# Patient Record
Sex: MALE | Race: WHITE | NOT HISPANIC OR LATINO | Employment: OTHER | URBAN - METROPOLITAN AREA
[De-identification: names, ages, dates, MRNs, and addresses within clinical notes are randomized per-mention and may not be internally consistent; named-entity substitution may affect disease eponyms.]

---

## 2017-08-14 ENCOUNTER — COMMUNICATION - HEALTHEAST (OUTPATIENT)
Dept: UROLOGY | Facility: CLINIC | Age: 72
End: 2017-08-14

## 2017-08-24 ENCOUNTER — OFFICE VISIT - HEALTHEAST (OUTPATIENT)
Dept: UROLOGY | Facility: CLINIC | Age: 72
End: 2017-08-24

## 2017-08-24 DIAGNOSIS — N20.1 CALCULUS OF URETER: ICD-10-CM

## 2017-08-24 DIAGNOSIS — N13.2 HYDRONEPHROSIS WITH URINARY OBSTRUCTION DUE TO URETERAL CALCULUS: ICD-10-CM

## 2017-08-24 DIAGNOSIS — N20.9 URINARY CALCULUS, UNSPECIFIED: ICD-10-CM

## 2017-08-24 DIAGNOSIS — N20.0 CALCULUS OF KIDNEY: ICD-10-CM

## 2017-09-05 ENCOUNTER — HOSPITAL ENCOUNTER (OUTPATIENT)
Dept: CT IMAGING | Facility: CLINIC | Age: 72
Discharge: HOME | End: 2017-09-05
Attending: PHYSICIAN ASSISTANT

## 2017-09-05 ENCOUNTER — OFFICE VISIT - HEALTHEAST (OUTPATIENT)
Dept: UROLOGY | Facility: CLINIC | Age: 72
End: 2017-09-05

## 2017-09-05 DIAGNOSIS — N20.1 CALCULUS OF URETER: ICD-10-CM

## 2017-09-05 DIAGNOSIS — N20.9 URINARY CALCULUS, UNSPECIFIED: ICD-10-CM

## 2017-09-18 ENCOUNTER — HOSPITAL ENCOUNTER (OUTPATIENT)
Dept: CT IMAGING | Facility: CLINIC | Age: 72
Discharge: HOME | End: 2017-09-18
Attending: PHYSICIAN ASSISTANT

## 2017-09-18 ENCOUNTER — OFFICE VISIT - HEALTHEAST (OUTPATIENT)
Dept: UROLOGY | Facility: CLINIC | Age: 72
End: 2017-09-18

## 2017-09-18 DIAGNOSIS — N20.9 URINARY CALCULUS, UNSPECIFIED: ICD-10-CM

## 2017-09-18 DIAGNOSIS — N20.1 CALCULUS OF URETER: ICD-10-CM

## 2017-10-17 ENCOUNTER — HOSPITAL ENCOUNTER (OUTPATIENT)
Dept: CT IMAGING | Facility: CLINIC | Age: 72
Discharge: HOME | End: 2017-10-17
Attending: PHYSICIAN ASSISTANT

## 2017-10-17 ENCOUNTER — OFFICE VISIT - HEALTHEAST (OUTPATIENT)
Dept: UROLOGY | Facility: CLINIC | Age: 72
End: 2017-10-17

## 2017-10-17 DIAGNOSIS — N20.1 CALCULUS OF URETER: ICD-10-CM

## 2017-10-17 DIAGNOSIS — N20.0 CALCULUS OF KIDNEY: ICD-10-CM

## 2017-10-17 DIAGNOSIS — N20.9 URINARY TRACT STONES: ICD-10-CM

## 2017-10-24 ENCOUNTER — SURGERY - HEALTHEAST (OUTPATIENT)
Dept: SURGERY | Facility: CLINIC | Age: 72
End: 2017-10-24

## 2017-10-24 ENCOUNTER — ANESTHESIA - HEALTHEAST (OUTPATIENT)
Dept: SURGERY | Facility: CLINIC | Age: 72
End: 2017-10-24

## 2017-10-24 ASSESSMENT — MIFFLIN-ST. JEOR: SCORE: 1579.49

## 2017-10-28 ENCOUNTER — COMMUNICATION - HEALTHEAST (OUTPATIENT)
Dept: SCHEDULING | Facility: CLINIC | Age: 72
End: 2017-10-28

## 2017-10-28 DIAGNOSIS — Z98.890 S/P CYSTOSCOPY: ICD-10-CM

## 2017-10-30 ENCOUNTER — COMMUNICATION - HEALTHEAST (OUTPATIENT)
Dept: UROLOGY | Facility: CLINIC | Age: 72
End: 2017-10-30

## 2017-10-31 ENCOUNTER — AMBULATORY - HEALTHEAST (OUTPATIENT)
Dept: UROLOGY | Facility: CLINIC | Age: 72
End: 2017-10-31

## 2017-10-31 DIAGNOSIS — N20.1 CALCULUS OF URETER: ICD-10-CM

## 2017-10-31 DIAGNOSIS — N20.9 CALCULUS OF URINARY TRACT: ICD-10-CM

## 2017-11-30 ENCOUNTER — OFFICE VISIT - HEALTHEAST (OUTPATIENT)
Dept: UROLOGY | Facility: CLINIC | Age: 72
End: 2017-11-30

## 2017-11-30 DIAGNOSIS — N20.0 CALCULUS OF KIDNEY: ICD-10-CM

## 2017-11-30 DIAGNOSIS — R34 LOW URINE OUTPUT: ICD-10-CM

## 2017-11-30 DIAGNOSIS — N20.1 CALCULUS OF URETER: ICD-10-CM

## 2017-11-30 DIAGNOSIS — N20.9 URINARY TRACT STONES: ICD-10-CM

## 2019-02-21 ENCOUNTER — RECORDS - HEALTHEAST (OUTPATIENT)
Dept: LAB | Facility: CLINIC | Age: 74
End: 2019-02-21

## 2019-02-21 LAB
ALBUMIN SERPL-MCNC: 3.7 G/DL (ref 3.5–5)
ALP SERPL-CCNC: 88 U/L (ref 45–120)
ALT SERPL W P-5'-P-CCNC: 12 U/L (ref 0–45)
ANION GAP SERPL CALCULATED.3IONS-SCNC: 8 MMOL/L (ref 5–18)
AST SERPL W P-5'-P-CCNC: 16 U/L (ref 0–40)
BILIRUB SERPL-MCNC: 0.7 MG/DL (ref 0–1)
BUN SERPL-MCNC: 12 MG/DL (ref 8–28)
CALCIUM SERPL-MCNC: 8.9 MG/DL (ref 8.5–10.5)
CHLORIDE BLD-SCNC: 107 MMOL/L (ref 98–107)
CHOLEST SERPL-MCNC: 158 MG/DL
CO2 SERPL-SCNC: 27 MMOL/L (ref 22–31)
CREAT SERPL-MCNC: 0.82 MG/DL (ref 0.7–1.3)
FASTING STATUS PATIENT QL REPORTED: NORMAL
GFR SERPL CREATININE-BSD FRML MDRD: >60 ML/MIN/1.73M2
GLUCOSE BLD-MCNC: 88 MG/DL (ref 70–125)
HDLC SERPL-MCNC: 43 MG/DL
LDLC SERPL CALC-MCNC: 95 MG/DL
POTASSIUM BLD-SCNC: 3.9 MMOL/L (ref 3.5–5)
PROT SERPL-MCNC: 6.1 G/DL (ref 6–8)
SODIUM SERPL-SCNC: 142 MMOL/L (ref 136–145)
TRIGL SERPL-MCNC: 99 MG/DL

## 2020-11-18 ENCOUNTER — RECORDS - HEALTHEAST (OUTPATIENT)
Dept: LAB | Facility: CLINIC | Age: 75
End: 2020-11-18

## 2020-11-18 LAB
ALBUMIN SERPL-MCNC: 4 G/DL (ref 3.5–5)
ALP SERPL-CCNC: 109 U/L (ref 45–120)
ALT SERPL W P-5'-P-CCNC: 16 U/L (ref 0–45)
ANION GAP SERPL CALCULATED.3IONS-SCNC: 10 MMOL/L (ref 5–18)
AST SERPL W P-5'-P-CCNC: 16 U/L (ref 0–40)
BILIRUB SERPL-MCNC: 0.5 MG/DL (ref 0–1)
BUN SERPL-MCNC: 13 MG/DL (ref 8–28)
CALCIUM SERPL-MCNC: 9.4 MG/DL (ref 8.5–10.5)
CHLORIDE BLD-SCNC: 106 MMOL/L (ref 98–107)
CO2 SERPL-SCNC: 28 MMOL/L (ref 22–31)
CREAT SERPL-MCNC: 0.83 MG/DL (ref 0.7–1.3)
GFR SERPL CREATININE-BSD FRML MDRD: >60 ML/MIN/1.73M2
GLUCOSE BLD-MCNC: 83 MG/DL (ref 70–125)
POTASSIUM BLD-SCNC: 4 MMOL/L (ref 3.5–5)
PROT SERPL-MCNC: 6.6 G/DL (ref 6–8)
SODIUM SERPL-SCNC: 144 MMOL/L (ref 136–145)

## 2020-11-19 LAB
CHOLEST SERPL-MCNC: 161 MG/DL
FASTING STATUS PATIENT QL REPORTED: NO
HDLC SERPL-MCNC: 41 MG/DL
LDLC SERPL CALC-MCNC: 65 MG/DL
TRIGL SERPL-MCNC: 273 MG/DL

## 2021-05-24 ENCOUNTER — RECORDS - HEALTHEAST (OUTPATIENT)
Dept: ADMINISTRATIVE | Facility: CLINIC | Age: 76
End: 2021-05-24

## 2021-05-29 ENCOUNTER — RECORDS - HEALTHEAST (OUTPATIENT)
Dept: ADMINISTRATIVE | Facility: CLINIC | Age: 76
End: 2021-05-29

## 2021-05-31 ENCOUNTER — RECORDS - HEALTHEAST (OUTPATIENT)
Dept: ADMINISTRATIVE | Facility: CLINIC | Age: 76
End: 2021-05-31

## 2021-05-31 VITALS — BODY MASS INDEX: 26.61 KG/M2 | HEIGHT: 70 IN | WEIGHT: 185.9 LBS

## 2021-06-01 ENCOUNTER — RECORDS - HEALTHEAST (OUTPATIENT)
Dept: ADMINISTRATIVE | Facility: CLINIC | Age: 76
End: 2021-06-01

## 2021-06-02 ENCOUNTER — RECORDS - HEALTHEAST (OUTPATIENT)
Dept: ADMINISTRATIVE | Facility: CLINIC | Age: 76
End: 2021-06-02

## 2021-06-12 NOTE — PROGRESS NOTES
Assessment/Plan:        Diagnoses and all orders for this visit:    Calculus of ureter  -     Symptom Control While Passing a Stone Education  -     CT Abdomen Pelvis Without Oral Without IV Contrast; Future; Expected date: 8/31/17  -     Discontinue: tamsulosin (FLOMAX) 0.4 mg Cp24; Take 1 capsule (0.4 mg total) by mouth daily for 14 days.  Dispense: 14 capsule; Refill: 1  -     tamsulosin (FLOMAX) 0.4 mg Cp24; Take 1 capsule (0.4 mg total) by mouth daily for 14 days.  Dispense: 14 capsule; Refill: 1    Urinary calculus, unspecified  -     Urinalysis Macroscopic    Calculus of kidney    Hydronephrosis with urinary obstruction due to ureteral calculus      Stone Management Plan  [facility] Stone Management 6/5/2015 7/15/2015 8/24/2017   Urinary Tract Infection No suspicion of infection No suspicion of infection No suspicion of infection   Renal Colic Asymptomatic at this time Asymptomatic at this time Asymptomatic at this time   Renal Failure No suspicion of renal failure No suspicion of renal failure No suspicion of renal failure   Current CT date 6/5/2015 - 8/13/2017   Right sided stones? No No Yes   R Number of ureteral stones - - No ureteral stones   R Number of kidney stones  - - 1   R GSD of kidney stones - - < 2   R Hydronephrosis - - None   R Stone Event No current event No current event No current event   R Current Plan - - Observe   Observe rationale - - Limited stone burden with good prognosis for spontaneous passage   Left sided stones? Yes Yes Yes   L Number of ureteral stones 1 No ureteral stones 1   L GSD of ureteral stones 3 - 4   L Location of ureteral stone Distal - Proximal   L Number of kidney stones  No renal stones 1 1   L GSD of kidney stones - < 2 < 2   L Hydronephrosis Mild - Mild   L Stone Event New event Resolved event New event   Diagnosis date 6/4/2015 - 8/13/2017   Initial location of primary symptomatic stone Distal - Proximal   Initial GSD of primary symptomatic stone 3 - 4   Resolved date -  6/7/2015 -   L MET Status Initiation Passed Initiation   L Current Plan MET Observe MET   MET 2 week F/U - 1 week F/U   Observe rationale - Limited stone burden with good prognosis for spontaneous passage -         Subjective:      HPI  Mr. Raymon Delacruz is a 71 y.o.  male returning to the [facility] [place] for unanticipated visit with acute exacerbation of chronic stone disease.      He is a rapidly recurrent calcium oxalate stone former who has not required stone clearance procedures. He has participated in stone risk evaluation in the past. He has identified modifiable stone risks including:  low urine volume. He has no identified non-modifiable stone risk factors.    Braydon was last seen 2015 following spontaneous passage of 100 % calcium oxalate 3 mm left distal ureteral stone. No other stones noted on imaging. He was to return in 1 year with imaging.    Primary symptom at presentation was acute onset, sharp left flank pain x few hours. The pain was constant without radiation. He had similar pain with prior stone event. He had associated symptoms of hematuria without clots x 1, resolved. He was evaluated in the ED 8/13/17 with labs demonstrating hematuria, mild pyuria with intact renal function and normal CRP. A CT scan reported a mildly obstructing 4 mm left upper ureteral stone and bilateral renal stones. He was discharged home with advil, meclizine, and vicodin.     He is asymptomatic at present. He had an episode of 3/10 left flank pain yesterday. He has used medication on prn basis. He has not seen a stone pass in the interval. He denies symptoms of fever, chills, flank pain, nausea, vomiting, urinary frequency and dysuria.    CT scan from 8/13/2017 is personally reviewed and demonstrates a mildly obstructing 4 mm left proximal ureteral stone. Additional < 2 mm left mid pole calculus. Single, 1 mm right upper pole papillary tip calcification.    Significant labs from presentation  include severe hematuria, mild pyuria, negative nitrite, no bacteria, normal C reactive protein, normal creatinine and normal potassium.    PLAN    72 yo M with history of single prior stone event, passed spontaneously. Rapidly recurrent calcium oxalate stone disease with new, mildly obstructing left urolithiasis. Additional, renal papillary tip calcifications.    Will proceed with medical expulsive therapy. Risks and benefits were detailed of medical expulsive therapy including probability of stone passage, recurrent renal colic, and requirement of emergency medical and/or surgical care and further imaging. Patient verbalized understanding. Patient agrees with plan as discussed. He will return in ~ 1 week with low dose CT scan.    For symptom control, he was prescribed vicodin and meclizine from ED. Will initiate flomax today. Over the counter symptom control medications of ibuprofen and Dramamine were recommended.    Patient also seen and examined by Leny Doherty PA-C      ROS   Review of systems is negative except for HPI.    Past Medical History:   Diagnosis Date     High cholesterol      Kidney stone        Past Surgical History:   Procedure Laterality Date     ABDOMINAL SURGERY      exploratory surgery at age 13     APPENDECTOMY       JOINT REPLACEMENT      knee     URETEROSCOPY Right 2013       Current Outpatient Prescriptions   Medication Sig Dispense Refill     ascorbic acid (ASCORBIC ACID WITH BLAKE HIPS) 500 MG tablet Take 500 mg by mouth daily.       aspirin 81 MG EC tablet Take 81 mg by mouth daily.       cyanocobalamin, vitamin B-12, 1,000 mcg/mL Kit Inject as directed.       HYDROcodone-acetaminophen 5-325 mg per tablet Take 1 tablet by mouth every 6 (six) hours as needed. 12 tablet 0     ibuprofen (ADVIL,MOTRIN) 600 MG tablet Take 1 tablet (600 mg total) by mouth every 6 (six) hours as needed for pain. 20 tablet 0     meclizine (ANTIVERT) 25 mg tablet Take 1 tablet (25 mg total) by mouth 3 (three)  times a day as needed. 15 tablet 0     SIMVASTATIN ORAL Take by mouth.       No current facility-administered medications for this visit.        Allergies   Allergen Reactions     Iodinated Contrast- Oral And Iv Dye Rash       Social History     Social History     Marital status:      Spouse name: N/A     Number of children: N/A     Years of education: N/A     Occupational History      [employer]      Social History Main Topics     Smoking status: Current Every Day Smoker     Years: 20.00     Smokeless tobacco: Not on file      Comment: one pack per week     Alcohol use No     Drug use: No     Sexual activity: Not on file     Other Topics Concern     Not on file     Social History Narrative     No narrative on file       Family History   Problem Relation Age of Onset     Heart disease Father      Objective:      Physical Exam  There were no vitals filed for this visit.  General - well developed, well nourished, appropriate for age. Appears no distress at this time  Abdomen - slender soft, non-tender, no hepatosplenomegaly, no masses.   - no flank tenderness, no suprapubic tenderness, kidney and bladder non-palpable  MSK - normal spinal curvature. no spinal tenderness. normal gait. muscular strength intact.  Psych - oriented to time, place, and person, normal mood and affect.        Labs   Urinalysis POC (Office):  Blood UA   Date Value Ref Range Status   07/15/2015 Negative Negative Final   06/05/2015 Negative Negative Final   01/08/2015 Negative Negative Final     Nitrite, UA   Date Value Ref Range Status   08/13/2017 Negative Negative Final   07/15/2015 Negative Negative Final   06/05/2015 Negative Negative Final   01/08/2015 Negative Negative Final   08/27/2013 Negative (Negative) Final     Leukocytes, UA    Date Value Ref Range Status   07/15/2015 Negative Negative Final   06/05/2015 Negative Negative Final   01/08/2015 Negative Negative Final     pH UA   Date  Value Ref Range Status   07/15/2015 5.5 4.5 - 8.0 Final   06/05/2015 5.5 4.5 - 8.0 Final   01/08/2015 7.0 4.5 - 8.0 Final       Lab Urinalysis:  Blood, UA   Date Value Ref Range Status   08/13/2017 Large (!) Negative Final   08/27/2013 Large (!) (Negative) Final     Nitrite, UA   Date Value Ref Range Status   08/13/2017 Negative Negative Final   07/15/2015 Negative Negative Final   06/05/2015 Negative Negative Final   01/08/2015 Negative Negative Final   08/27/2013 Negative (Negative) Final     Leukocytes, UA   Date Value Ref Range Status   08/13/2017 Trace (!) Negative Final   08/27/2013 Negative (Negative) Final     pH, UA   Date Value Ref Range Status   08/13/2017 6.0 4.5 - 8.0 Final   12/04/2013 6.5 4.5 - 8.0 Final   11/26/2013 5.5 4.5 - 8.0 Final    and Acute Labs   C Reactive Protein    CRP   Date Value Ref Range Status   08/13/2017 0.6 0.0 - 0.8 mg/dL Final   08/27/2013 0.7 <0.9 mg/dL Final    and Renal Panel  [facility]  Creatinine   Date Value Ref Range Status   08/13/2017 0.78 0.70 - 1.30 mg/dL Final   01/21/2016 0.80 0.70 - 1.30 mg/dL Final   12/05/2013 0.82 0.70 - 1.30 mg/dL Final     Potassium   Date Value Ref Range Status   08/13/2017 3.9 3.5 - 5.0 mmol/L Final   01/21/2016 4.2 3.5 - 5.0 mmol/L Final   12/05/2013 4.3 3.5 - 5.0 mmol/L Final     Calcium   Date Value Ref Range Status   08/13/2017 9.4 8.5 - 10.5 mg/dL Final   01/21/2016 9.3 8.5 - 10.5 mg/dL Final   12/05/2013 9.6 8.5 - 10.5 mg/dL Final

## 2021-06-12 NOTE — PROGRESS NOTES
Assessment/Plan:        Diagnoses and all orders for this visit:    Calculus of ureter  -     tamsulosin (FLOMAX) 0.4 mg Cp24; Take 1 capsule (0.4 mg total) by mouth daily for 14 days.  Dispense: 14 capsule; Refill: 1  -     Symptom Control While Passing a Stone Education  -     CT Abdomen Pelvis Without Oral Without IV Contrast; Future; Expected date: 9/19/17    Urinary calculus, unspecified  -     Urinalysis Macroscopic      Stone Management Plan  [facility] Stone Management 7/15/2015 8/24/2017 9/5/2017   Urinary Tract Infection No suspicion of infection No suspicion of infection No suspicion of infection   Renal Colic Asymptomatic at this time Asymptomatic at this time Asymptomatic at this time   Renal Failure No suspicion of renal failure No suspicion of renal failure No suspicion of renal failure   Current CT date - 8/13/2017 9/5/2017   Right sided stones? No Yes Yes   R Number of ureteral stones - No ureteral stones No ureteral stones   R Number of kidney stones  - 1 Renal stones unchanged from last exam   R GSD of kidney stones - < 2 < 2   R Hydronephrosis - None None   R Stone Event No current event No current event No current event   R Current Plan - Observe Observe   Observe rationale - Limited stone burden with good prognosis for spontaneous passage Limited stone burden with good prognosis for spontaneous passage   Left sided stones? Yes Yes Yes   L Number of ureteral stones No ureteral stones 1 1   L GSD of ureteral stones - 4 4   L Location of ureteral stone - Proximal Distal   L Number of kidney stones  1 1 1   L GSD of kidney stones < 2 < 2 < 2   L Hydronephrosis - Mild None   L Stone Event Resolved event New event Established event   Diagnosis date - 8/13/2017 -   Initial location of primary symptomatic stone - Proximal -   Initial GSD of primary symptomatic stone - 4 -   Resolved date 6/7/2015 - -   L MET Status Passed Initiation Progression   L Current Plan Observe MET MET   MET - 1 week F/U 2 week F/U    Observe rationale Limited stone burden with good prognosis for spontaneous passage - -             Subjective:      HPI  Mr. Raymon Delacruz is a 71 y.o.  male returning to the [facility] [place] for medical expulsive therapy follow up.     On last encounter, his 4 mm stone was in left proximal ureter with mild hydronephrosis. He has had no unanticipated events.    Symptoms have been well controlled with medication and he is able to carry on normal activities. He is asymptomatic at present. He denies symptoms of fever, chills, flank pain, nausea, vomiting, urinary frequency and dysuria.     New CT scan was personally reviewed and demonstrates progression of stone to left distal ureter with resolution of previous hydronephrosis.     PLAN    72 yo M with history of single prior stone event, passed spontaneously. Rapidly recurrent calcium oxalate stone disease with distal progression of left urolithiasis, now in distal ureter. Asymptomatic.    He will continue to attempt to pass stone and will return in 2 weeks with further imaging.    Patient also seen and examined by Leny Doherty PA-C      ROS   Review of systems is negative except for HPI.    Past Medical History:   Diagnosis Date     High cholesterol      Kidney stone        Past Surgical History:   Procedure Laterality Date     ABDOMINAL SURGERY      exploratory surgery at age 13     APPENDECTOMY       JOINT REPLACEMENT      knee     URETEROSCOPY Right 2013       Current Outpatient Prescriptions   Medication Sig Dispense Refill     ascorbic acid (ASCORBIC ACID WITH BLAKE HIPS) 500 MG tablet Take 500 mg by mouth daily.       aspirin 81 MG EC tablet Take 81 mg by mouth daily.       cyanocobalamin, vitamin B-12, 1,000 mcg/mL Kit Inject as directed.       ibuprofen (ADVIL,MOTRIN) 600 MG tablet Take 1 tablet (600 mg total) by mouth every 6 (six) hours as needed for pain. 20 tablet 0     meclizine (ANTIVERT) 25 mg tablet Take 1 tablet (25 mg  total) by mouth 3 (three) times a day as needed. 15 tablet 0     SIMVASTATIN ORAL Take by mouth.       tamsulosin (FLOMAX) 0.4 mg Cp24 Take 1 capsule (0.4 mg total) by mouth daily for 14 days. 14 capsule 1     HYDROcodone-acetaminophen 5-325 mg per tablet Take 1 tablet by mouth every 6 (six) hours as needed. 12 tablet 0     No current facility-administered medications for this visit.        Allergies   Allergen Reactions     Iodinated Contrast- Oral And Iv Dye Rash       Social History     Social History     Marital status:      Spouse name: N/A     Number of children: N/A     Years of education: N/A     Occupational History      [employer]      Social History Main Topics     Smoking status: Current Every Day Smoker     Years: 20.00     Smokeless tobacco: Never Used      Comment: one pack per week     Alcohol use No     Drug use: No     Sexual activity: Not on file     Other Topics Concern     Not on file     Social History Narrative       Family History   Problem Relation Age of Onset     Heart disease Father      Objective:      Physical Exam  Vitals:    09/05/17 1104   BP: (!) 172/93   Pulse: 65   Temp: 98.2  F (36.8  C)     General - well developed, well nourished, appropriate for age. Appears no distress at this time  Abdomen - slender soft, non-tender, no hepatosplenomegaly, no masses.   - no flank tenderness, no suprapubic tenderness, kidney and bladder non-palpable  MSK - normal spinal curvature. no spinal tenderness. normal gait. muscular strength intact.  Psych - oriented to time, place, and person, normal mood and affect.        Labs   Urinalysis POC (Office):  Blood UA   Date Value Ref Range Status   07/15/2015 Negative Negative Final   06/05/2015 Negative Negative Final   01/08/2015 Negative Negative Final     Nitrite, UA   Date Value Ref Range Status   09/05/2017 Negative Negative Final   08/24/2017 Negative Negative Final   08/13/2017 Negative  Negative Final     Leukocytes, UA    Date Value Ref Range Status   07/15/2015 Negative Negative Final   06/05/2015 Negative Negative Final   01/08/2015 Negative Negative Final     pH UA   Date Value Ref Range Status   07/15/2015 5.5 4.5 - 8.0 Final   06/05/2015 5.5 4.5 - 8.0 Final   01/08/2015 7.0 4.5 - 8.0 Final       Lab Urinalysis:  Blood, UA   Date Value Ref Range Status   09/05/2017 Negative Negative Final   08/24/2017 Moderate (!) Negative Final   08/13/2017 Large (!) Negative Final     Nitrite, UA   Date Value Ref Range Status   09/05/2017 Negative Negative Final   08/24/2017 Negative Negative Final   08/13/2017 Negative Negative Final     Leukocytes, UA   Date Value Ref Range Status   09/05/2017 Negative Negative Final   08/24/2017 Small (!) Negative Final   08/13/2017 Trace (!) Negative Final     pH, UA   Date Value Ref Range Status   09/05/2017 7.0 5.0 - 8.0 Final   08/24/2017 5.5 4.5 - 8.0 Final   08/13/2017 6.0 4.5 - 8.0 Final

## 2021-06-13 NOTE — PROGRESS NOTES
Assessment/Plan:        Diagnoses and all orders for this visit:    Calculus of ureter  -     Symptom Control While Passing a Stone Education  -     CT Abdomen Pelvis Without Oral Without IV Contrast; Future; Expected date: 10/16/17  -     tamsulosin (FLOMAX) 0.4 mg Cp24; Take 1 capsule (0.4 mg total) by mouth daily.  Dispense: 30 capsule; Refill: 0    Urinary calculus, unspecified  -     Urinalysis Macroscopic      Stone Management Plan  [facility] Stone Management 8/24/2017 9/5/2017 9/18/2017   Urinary Tract Infection No suspicion of infection No suspicion of infection No suspicion of infection   Renal Colic Asymptomatic at this time Asymptomatic at this time Asymptomatic at this time   Renal Failure No suspicion of renal failure No suspicion of renal failure No suspicion of renal failure   Current CT date 8/13/2017 9/5/2017 9/18/2017   Right sided stones? Yes Yes Yes   R Number of ureteral stones No ureteral stones No ureteral stones No ureteral stones   R Number of kidney stones  1 Renal stones unchanged from last exam Renal stones unchanged from last exam   R GSD of kidney stones < 2 < 2 < 2   R Hydronephrosis None None None   R Stone Event No current event No current event No current event   R Current Plan Observe Observe Observe   Observe rationale Limited stone burden with good prognosis for spontaneous passage Limited stone burden with good prognosis for spontaneous passage Limited stone burden with good prognosis for spontaneous passage   Left sided stones? Yes Yes Yes   L Number of ureteral stones 1 1 1   L GSD of ureteral stones 4 4 4   L Location of ureteral stone Proximal Distal Distal   L Number of kidney stones  1 1 1   L GSD of kidney stones < 2 < 2 < 2   L Hydronephrosis Mild None None   L Stone Event New event Established event Established event   Diagnosis date 8/13/2017 - -   Initial location of primary symptomatic stone Proximal - -   Initial GSD of primary symptomatic stone 4 - -   Resolved date - -  -   L MET Status Initiation Progression No progression   L Current Plan MET MET MET   MET 1 week F/U 2 week F/U (No Data)   Observe rationale - - -             Subjective:      HPIMrMaggie Delacruz is a 71 y.o.  male returning to the [facility] [place] for medical expulsive therapy follow up.     On last encounter, his 4 mm stone was in left distal ureter with no hydronephrosis. He has had no unanticipated events.    Symptoms have been minimal . He has been asymptomatic with stone following initial diagnosis on 8/13/17. He is asymptomatic at present. He denies symptoms of fever, chills, flank pain, nausea, vomiting, urinary frequency and dysuria.     New CT scan was personally reviewed and demonstrates no progression of stone with no hydronephrosis.     PLAN    70 yo M with hx of single prior stone event. No progression of non-obstructing left distal ureteral calculus, asymptomatic.     He will continue to attempt to pass stone and will return in 4 weeks with further imaging.     Patient also seen and examined by Leny Doherty PA-C       ROS   Review of systems is negative except for HPI.    Past Medical History:   Diagnosis Date     High cholesterol      Kidney stone      Past Surgical History:   Procedure Laterality Date     ABDOMINAL SURGERY      exploratory surgery at age 13     APPENDECTOMY       JOINT REPLACEMENT      knee     URETEROSCOPY Right 2013       Current Outpatient Prescriptions   Medication Sig Dispense Refill     ascorbic acid (ASCORBIC ACID WITH BLAKE HIPS) 500 MG tablet Take 500 mg by mouth daily.       aspirin 81 MG EC tablet Take 81 mg by mouth daily.       cyanocobalamin, vitamin B-12, 1,000 mcg/mL Kit Inject as directed.       ibuprofen (ADVIL,MOTRIN) 600 MG tablet Take 1 tablet (600 mg total) by mouth every 6 (six) hours as needed for pain. 20 tablet 0     meclizine (ANTIVERT) 25 mg tablet Take 1 tablet (25 mg total) by mouth 3 (three) times a day as needed. 15 tablet  0     SIMVASTATIN ORAL Take by mouth.       tamsulosin (FLOMAX) 0.4 mg Cp24 Take 1 capsule (0.4 mg total) by mouth daily. 30 capsule 0     HYDROcodone-acetaminophen 5-325 mg per tablet Take 1 tablet by mouth every 6 (six) hours as needed. 12 tablet 0     No current facility-administered medications for this visit.        Allergies   Allergen Reactions     Iodinated Contrast- Oral And Iv Dye Rash       Social History     Social History     Marital status:      Spouse name: N/A     Number of children: N/A     Years of education: N/A     Occupational History      [employer]      Social History Main Topics     Smoking status: Current Every Day Smoker     Years: 20.00     Smokeless tobacco: Never Used      Comment: one pack per week     Alcohol use No     Drug use: No     Sexual activity: Not on file     Other Topics Concern     Not on file     Social History Narrative       Family History   Problem Relation Age of Onset     Heart disease Father      Objective:      Physical Exam  Vitals:    09/18/17 1403   BP: 160/85   Pulse: 71   Temp: 97.9  F (36.6  C)     General - well developed, well nourished, appropriate for age. Appears no distress at this time  Abdomen - soft, non-tender, no hepatosplenomegaly, no masses.   - no flank tenderness, no suprapubic tenderness, kidney and bladder non-palpable  MSK - normal spinal curvature. no spinal tenderness. normal gait. muscular strength intact.  Psych - oriented to time, place, and person, normal mood and affect.        Labs   Urinalysis POC (Office):  Blood UA   Date Value Ref Range Status   07/15/2015 Negative Negative Final   06/05/2015 Negative Negative Final   01/08/2015 Negative Negative Final     Nitrite, UA   Date Value Ref Range Status   09/18/2017 Negative Negative Final   09/05/2017 Negative Negative Final   08/24/2017 Negative Negative Final     Leukocytes, UA    Date Value Ref Range Status   07/15/2015 Negative  Negative Final   06/05/2015 Negative Negative Final   01/08/2015 Negative Negative Final     pH UA   Date Value Ref Range Status   07/15/2015 5.5 4.5 - 8.0 Final   06/05/2015 5.5 4.5 - 8.0 Final   01/08/2015 7.0 4.5 - 8.0 Final       Lab Urinalysis:  Blood, UA   Date Value Ref Range Status   09/18/2017 Negative Negative Final   09/05/2017 Negative Negative Final   08/24/2017 Moderate (!) Negative Final     Nitrite, UA   Date Value Ref Range Status   09/18/2017 Negative Negative Final   09/05/2017 Negative Negative Final   08/24/2017 Negative Negative Final     Leukocytes, UA   Date Value Ref Range Status   09/18/2017 Negative Negative Final   09/05/2017 Negative Negative Final   08/24/2017 Small (!) Negative Final     pH, UA   Date Value Ref Range Status   09/18/2017 6.0 5.0 - 8.0 Final   09/05/2017 7.0 5.0 - 8.0 Final   08/24/2017 5.5 4.5 - 8.0 Final

## 2021-06-13 NOTE — PROGRESS NOTES
Patient educated regarding stent removal procedure and possible symptoms after removal.  Patient voiced understanding of information.  Handout given to patient.  Consent form signed.     [facility] Timeout    Correct patient?: Yes  Correct site?:  Yes  Correct procedure?:  Yes  Correct laterality?:  Left  Consents verified?:  Yes  Relevant lab results available?:  Yes

## 2021-06-13 NOTE — PROGRESS NOTES
Assessment/Plan:        Diagnoses and all orders for this visit:    Calculus of ureter  -     Place sequential compression device; Standing  -     Patient Stated Goal: Know what to expect after surgery  -     Ureteroscopy Education  -     Verify informed consent; Standing  -     Diet NPO; Standing  -     XR Abdomen AP; Standing  -     levoFLOXacin 500 mg/100 mL IVPB 500 mg (LEVAQUIN); Infuse 100 mL (500 mg total) into a venous catheter 60 minutes before surgery or procedure (On Call to OR).    Urinary tract stones  -     Urinalysis Macroscopic    Calculus of kidney      Stone Management Plan  [facility] Stone [facility] 9/5/2017 9/18/2017 10/17/2017   Urinary Tract Infection No suspicion of infection No suspicion of infection No suspicion of infection   Renal Colic Asymptomatic at this time Asymptomatic at this time Asymptomatic at this time   Renal Failure No suspicion of renal failure No suspicion of renal failure No suspicion of renal failure   Current CT date 9/5/2017 9/18/2017 10/17/2017   Right sided stones? Yes Yes Yes   R Number of ureteral stones No ureteral stones No ureteral stones No ureteral stones   R Number of kidney stones  Renal stones unchanged from last exam Renal stones unchanged from last exam Renal stones unchanged from last exam   R GSD of kidney stones < 2 < 2 < 2   R Hydronephrosis None None None   R Stone Event No current event No current event No current event   R Current Plan Observe Observe Observe   Observe rationale Limited stone burden with good prognosis for spontaneous passage Limited stone burden with good prognosis for spontaneous passage Limited stone burden with good prognosis for spontaneous passage   Left sided stones? Yes Yes Yes   L Number of ureteral stones 1 1 1   L GSD of ureteral stones 4 4 4   L Location of ureteral stone Distal Distal Distal   L Number of kidney stones  1 1 1   L GSD of kidney stones < 2 < 2 < 2   L Hydronephrosis None None None   L Stone Event Established  event Established event Established event   Diagnosis date - - -   Initial location of primary symptomatic stone - - -   Initial GSD of primary symptomatic stone - - -   Resolved date - - -   L MET Status Progression No progression No progression   L Current Plan MET MET Clear   MET 2 week F/U (No Data) -   Clear rationale - - MET failure   Observe rationale - - -         Subjective:      HPI  Mr. Raymon Delacruz is a 71 y.o.  male returning to the [facility] [place] for medical expulsive therapy follow up.     On last encounter, his 4 mm stone was in left distal ureter with without hydronephrosis. He has had no unanticipated events.    Symptoms have been minimal. He had an episode of mild left flank pain 2 weeks ago, but otherwise has been asymptomatic through most of this prolonged trial of passage period. He is asymptomatic at present. He denies symptoms of fever, chills, flank pain, nausea, vomiting, urinary frequency and dysuria.     New CT scan was personally reviewed and demonstrates no progression of stone with no hydronephrosis. Previous bilateral tiny renal papillary tip calcifications not visualized.     PLAN    72 yo M with hx of single prior stone event. No progression of non-obstructing left distal ureteral calculus, asymptomatic.     He has failed adequate duration of medical expulsive therapy and will proceed to the operating room for ureteroscopic stone clearance. Risks and benefits were detailed of ureteroscopic stone clearance including potential issues of urinary or systemic infection, ureteral injury, inaccessible stone, incomplete stone clearance, multiple surgeries, and stent related symptoms of urgency, frequency and hematuria. Current documentation is adequate for preoperative history and physical.    Patient also seen and examined by KRISTA Park   A 12 point comprehensive review of systems is negative except for HPI.    Past Medical History:   Diagnosis Date      High cholesterol      Kidney stone        Past Surgical History:   Procedure Laterality Date     ABDOMINAL SURGERY      exploratory surgery at age 13     APPENDECTOMY       JOINT REPLACEMENT      knee     URETEROSCOPY Right 2013       Current Outpatient Prescriptions   Medication Sig Dispense Refill     ascorbic acid (ASCORBIC ACID WITH BLAKE HIPS) 500 MG tablet Take 500 mg by mouth daily.       aspirin 81 MG EC tablet Take 81 mg by mouth daily.       cyanocobalamin, vitamin B-12, 1,000 mcg/mL Kit Inject as directed.       meclizine (ANTIVERT) 25 mg tablet Take 1 tablet (25 mg total) by mouth 3 (three) times a day as needed. 15 tablet 0     SIMVASTATIN ORAL Take by mouth.       tamsulosin (FLOMAX) 0.4 mg Cp24 Take 1 capsule (0.4 mg total) by mouth daily. 30 capsule 0     HYDROcodone-acetaminophen 5-325 mg per tablet Take 1 tablet by mouth every 6 (six) hours as needed. 12 tablet 0     ibuprofen (ADVIL,MOTRIN) 600 MG tablet Take 1 tablet (600 mg total) by mouth every 6 (six) hours as needed for pain. 20 tablet 0     No current facility-administered medications for this visit.        Allergies   Allergen Reactions     Iodinated Contrast- Oral And Iv Dye Rash       Social History     Social History     Marital status:      Spouse name: N/A     Number of children: N/A     Years of education: N/A     Occupational History      [employer]      Social History Main Topics     Smoking status: Current Every Day Smoker     Years: 20.00     Smokeless tobacco: Never Used      Comment: one pack per week     Alcohol use No     Drug use: No     Sexual activity: Not on file     Other Topics Concern     Not on file     Social History Narrative       Family History   Problem Relation Age of Onset     Heart disease Father      Objective:      Physical Exam  Vitals:    10/17/17 1306   BP: 156/89   Pulse: 93   Temp: 98.1  F (36.7  C)     General - well developed, well nourished,  appropriate for age. Appears no distress at this time   Heart - regular rate and rhythm, no murmur  Respiratory - normal effort, clear to auscultation, good air entry without adventitious noises  Abdomen - slender soft, non-tender, no hepatosplenomegaly, no masses.   - no flank tenderness, no suprapubic tenderness, kidney and bladder non-palpable  MSK - normal spinal curvature. no spinal tenderness. normal gait. muscular strength intact.  Neurology - cranial nerves II-XII grossly intact, normal sensation, no unsteadiness  Skin - intact, no bruising, no gouty tophi  Psych - oriented to time, place, and person, normal mood and affect.        Labs   Urinalysis POC (Office):  Blood UA   Date Value Ref Range Status   07/15/2015 Negative Negative Final   06/05/2015 Negative Negative Final   01/08/2015 Negative Negative Final     Nitrite, UA   Date Value Ref Range Status   10/17/2017 Negative Negative Final   09/18/2017 Negative Negative Final   09/05/2017 Negative Negative Final     Leukocytes, UA    Date Value Ref Range Status   07/15/2015 Negative Negative Final   06/05/2015 Negative Negative Final   01/08/2015 Negative Negative Final     pH UA   Date Value Ref Range Status   07/15/2015 5.5 4.5 - 8.0 Final   06/05/2015 5.5 4.5 - 8.0 Final   01/08/2015 7.0 4.5 - 8.0 Final       Lab Urinalysis:  Blood, UA   Date Value Ref Range Status   10/17/2017 Negative Negative Final   09/18/2017 Negative Negative Final   09/05/2017 Negative Negative Final     Nitrite, UA   Date Value Ref Range Status   10/17/2017 Negative Negative Final   09/18/2017 Negative Negative Final   09/05/2017 Negative Negative Final     Leukocytes, UA   Date Value Ref Range Status   10/17/2017 Negative Negative Final   09/18/2017 Negative Negative Final   09/05/2017 Negative Negative Final     pH, UA   Date Value Ref Range Status   10/17/2017 5.5 5.0 - 8.0 Final   09/18/2017 6.0 5.0 - 8.0 Final   09/05/2017 7.0 5.0 - 8.0 Final

## 2021-06-13 NOTE — PROGRESS NOTES
Assessment/Plan:        Diagnoses and all orders for this visit:    Calculus of ureter  -     Cystoscopy with Stent Removal Education  -     Patient Stated Goal: Prevent further stones    Calculus of urinary tract  -     Urinalysis Macroscopic  -     Culture, Urine  -     ciprofloxacin HCl tablet 250 mg (CIPRO); Take 1 tablet (250 mg total) by mouth once.  -     lidocaine HCl 2 % topical jelly 10 mL (UROJET); Insert 10 mL into the urethra once.    Other orders  -     ciprofloxacin HCl (CIPRO) 250 MG tablet;   -     lidocaine HCl (UROJET) 2 % topical jelly;       Stone Management Plan  [facility] Stone Management 9/18/2017 10/17/2017 10/31/2017   Urinary Tract Infection No suspicion of infection No suspicion of infection No suspicion of infection   Renal Colic Asymptomatic at this time Asymptomatic at this time Well controlled symptoms   Renal Failure No suspicion of renal failure No suspicion of renal failure No suspicion of renal failure   Current CT date 9/18/2017 10/17/2017 -   Right sided stones? Yes Yes -   R Number of ureteral stones No ureteral stones No ureteral stones -   R Number of kidney stones  Renal stones unchanged from last exam Renal stones unchanged from last exam -   R GSD of kidney stones < 2 < 2 -   R Hydronephrosis None None -   R Stone Event No current event No current event No current event   R Current Plan Observe Observe -   Observe rationale Limited stone burden with good prognosis for spontaneous passage Limited stone burden with good prognosis for spontaneous passage -   Left sided stones? Yes Yes -   L Number of ureteral stones 1 1 -   L GSD of ureteral stones 4 4 -   L Location of ureteral stone Distal Distal -   L Number of kidney stones  1 1 -   L GSD of kidney stones < 2 < 2 -   L Hydronephrosis None None -   L Stone Event Established event Established event Established event   Diagnosis date - - -   Initial location of primary symptomatic stone - - -   Initial GSD of primary symptomatic  stone - - -   Resolved date - - -   Post-op status - - Stent Removal   L MET Status No progression No progression -   L Current Plan MET Clear -   MET (No Data) - -   Clear rationale - MET failure -   Observe rationale - - -             Subjective:      HPI  Mr. Raymon Delacruz is a 71 y.o.  male returning to the [facility] [place] for early postoperative follow up for anticipated stent removal.     He returns status post left ureteroscopic extraction for distal ureteral stone. He has had no unanticipated post-operative events.    He has had no symptoms suspicious for infection and stent was mildly symptomatic with typical issues of flank discomfort and lower urinary tract irritation.     Flexible cystoscopy is performed and indwelling stent is removed without incident.    He will follow up in the office in one month without imaging.       ROS   Review of systems is negative except for HPI.    Past Medical History:   Diagnosis Date     High cholesterol      Kidney stone     recurrent       Past Surgical History:   Procedure Laterality Date     ABDOMINAL SURGERY      exploratory surgery at age 13, Appendectomy     CYSTOSCOPY W/ URETERAL STENT PLACEMENT Left 10/24/2017    Procedure: CYSTOSCOPY, WITH URETEROSCOPY, CALCULUS REMOVAL, AND URETERAL STENT INSERTION;  Surgeon: Gasper Conteh MD;  Location: [facility];  Service:      JOINT REPLACEMENT Left 2010    knee     URETEROSCOPY Right 2013       Current Outpatient Prescriptions   Medication Sig Dispense Refill     ascorbic acid (ASCORBIC ACID WITH BLAKE HIPS) 500 MG tablet Take 500 mg by mouth daily.       aspirin 81 MG EC tablet Take 81 mg by mouth daily.       cyanocobalamin, vitamin B-12, 1,000 mcg/mL Kit Inject as directed every 30 (thirty) days.        HYDROcodone-acetaminophen 5-325 mg per tablet Take 1 tablet by mouth every 6 (six) hours as needed. 12 tablet 0     ibuprofen (ADVIL,MOTRIN) 600 MG tablet Take 1 tablet (600 mg  total) by mouth every 6 (six) hours as needed for pain. 20 tablet 0     meclizine (ANTIVERT) 25 mg tablet Take 1 tablet (25 mg total) by mouth 3 (three) times a day as needed. 15 tablet 0     simvastatin (ZOCOR) 20 MG tablet Take 20 mg by mouth at bedtime.       tamsulosin (FLOMAX) 0.4 mg Cp24 Take 1 capsule (0.4 mg total) by mouth Daily after breakfast. Take with food 14 capsule 0     Current Facility-Administered Medications   Medication Dose Route Frequency Provider Last Rate Last Dose     ciprofloxacin HCl (CIPRO) 250 MG tablet              lidocaine HCl (UROJET) 2 % topical jelly                Allergies   Allergen Reactions     Iodinated Contrast- Oral And Iv Dye Rash       Social History     Social History     Marital status:      Spouse name: N/A     Number of children: N/A     Years of education: N/A     Occupational History      [employer]      Social History Main Topics     Smoking status: Current Every Day Smoker     Years: 20.00     Types: Cigarettes     Smokeless tobacco: Never Used      Comment: 3 packs per week     Alcohol use Yes      Comment: rarely     Drug use: No     Sexual activity: Not on file     Other Topics Concern     Not on file     Social History Narrative       Family History   Problem Relation Age of Onset     Heart disease Father      Objective:      Physical Exam  Vitals:    10/31/17 1001   BP: (!) 135/104   Pulse: 74   Temp: 98  F (36.7  C)     General - well developed, well nourished, appropriate for age. Appears no distress at this time  Abdomen - mildly obese soft, non-tender, no hepatosplenomegaly, no masses.   - no flank tenderness, no suprapubic tenderness, kidney and bladder non-palpable  MSK - normal spinal curvature. no spinal tenderness. normal gait. muscular strength intact.  Psych - oriented to time, place, and person, normal mood and affect.        Labs   Urinalysis POC (Office):  Blood UA   Date Value Ref Range Status    07/15/2015 Negative Negative Final   06/05/2015 Negative Negative Final   01/08/2015 Negative Negative Final     Nitrite, UA   Date Value Ref Range Status   10/31/2017 Negative Negative Final   10/17/2017 Negative Negative Final   09/18/2017 Negative Negative Final     Leukocytes, UA    Date Value Ref Range Status   07/15/2015 Negative Negative Final   06/05/2015 Negative Negative Final   01/08/2015 Negative Negative Final     pH UA   Date Value Ref Range Status   07/15/2015 5.5 4.5 - 8.0 Final   06/05/2015 5.5 4.5 - 8.0 Final   01/08/2015 7.0 4.5 - 8.0 Final       Lab Urinalysis:  Blood, UA   Date Value Ref Range Status   10/31/2017 Large (!) Negative Final   10/17/2017 Negative Negative Final   09/18/2017 Negative Negative Final     Nitrite, UA   Date Value Ref Range Status   10/31/2017 Negative Negative Final   10/17/2017 Negative Negative Final   09/18/2017 Negative Negative Final     Leukocytes, UA   Date Value Ref Range Status   10/31/2017 Small (!) Negative Final   10/17/2017 Negative Negative Final   09/18/2017 Negative Negative Final     pH, UA   Date Value Ref Range Status   10/31/2017 6.5 5.0 - 8.0 Final   10/17/2017 5.5 5.0 - 8.0 Final   09/18/2017 6.0 5.0 - 8.0 Final

## 2021-06-13 NOTE — ANESTHESIA PREPROCEDURE EVALUATION
Anesthesia Evaluation      Patient summary reviewed   No history of anesthetic complications     Airway   Mallampati: II  Neck ROM: full   Pulmonary - normal exam    breath sounds clear to auscultation                         Cardiovascular - normal exam   Neuro/Psych      Endo/Other       GI/Hepatic/Renal    (+)   chronic renal disease,           Dental - normal exam                        Anesthesia Plan  Planned anesthetic: general LMA    ASA 2   Induction: intravenous   Anesthetic plan and risks discussed with: patient  Anesthesia plan special considerations: antiemetics,   Post-op plan: routine recovery

## 2021-06-13 NOTE — ANESTHESIA POSTPROCEDURE EVALUATION
Patient: Raymon Delacruz  CYSTOSCOPY LEFT URETEROSOCPY STENT INSERTION  Anesthesia type: general    Patient location: PACU  Last vitals:   Vitals:    10/24/17 1324   BP:    Pulse:    Resp: 26   Temp:    SpO2:      Post vital signs: stable  Level of consciousness: awake and responds to simple questions  Post-anesthesia pain: pain controlled  Post-anesthesia nausea and vomiting: no  Pulmonary: unassisted, return to baseline  Cardiovascular: stable and blood pressure at baseline  Hydration: adequate  Anesthetic events: no    QCDR Measures:  ASA# 11 - Lali-op Cardiac Arrest: ASA11B - Patient did NOT experience unanticipated cardiac arrest  ASA# 12 - Lali-op Mortality Rate: ASA12B - Patient did NOT die  ASA# 13 - PACU Re-Intubation Rate: ASA13B - Patient did NOT require a new airway mgmt  ASA# 10 - Composite Anes Safety: ASA10A - No serious adverse event    Additional Notes:

## 2021-06-14 NOTE — PROGRESS NOTES
Assessment/Plan:        Diagnoses and all orders for this visit:    Calculus of ureter    Low urine output    Calculus of kidney  -     CT Abdomen Pelvis Without Oral Without IV Contrast; Future; Expected date: 11/30/18  -     Patient Stated Goal: Prevent further stones  -     Patient Increasing Fluids Education    Urinary tract stones  -     Urinalysis Macroscopic      Stone Management Plan  [facility] Stone Management 10/17/2017 10/31/2017 11/30/2017   Urinary Tract Infection No suspicion of infection No suspicion of infection No suspicion of infection   Renal Colic Asymptomatic at this time Well controlled symptoms Asymptomatic at this time   Renal Failure No suspicion of renal failure No suspicion of renal failure No suspicion of renal failure   Current CT date 10/17/2017 - -   Right sided stones? Yes - -   R Number of ureteral stones No ureteral stones - -   R Number of kidney stones  Renal stones unchanged from last exam - -   R GSD of kidney stones < 2 - -   R Hydronephrosis None - -   R Stone Event No current event No current event No current event   R Current Plan Observe - Observe   Observe rationale Limited stone burden with good prognosis for spontaneous passage - Limited stone burden with good prognosis for spontaneous passage   Left sided stones? Yes - -   L Number of ureteral stones 1 - -   L GSD of ureteral stones 4 - -   L Location of ureteral stone Distal - -   L Number of kidney stones  1 - -   L GSD of kidney stones < 2 - -   L Hydronephrosis None - -   L Stone Event Established event Established event Resolved event   Diagnosis date - - -   Initial location of primary symptomatic stone - - -   Initial GSD of primary symptomatic stone - - -   Resolved date - - 11/30/2017   Post-op status - Stent Removal No imaging   L MET Status No progression - -   L Current Plan Clear - Observe   MET - - -   Clear rationale MET failure - -   Observe rationale - - Limited stone burden with good prognosis for  spontaneous passage             Subjective:      HPI  Mr. Raymon Delacruz is a 72 y.o.  male returning to the [facility] [place] for late postoperative follow-up.     He returns status post Left ureteroscopic extraction for distal ureteral stone. He has had no unanticipated events.     He is asymptomatic at present. He denies symptoms of fever, chills, flank pain, nausea, vomiting, urinary frequency and dysuria.    Imaging was not performed today because stone was extracted intact and patient is asymptomatic. Preoperative imaging was notable for a couple tiny papillary tip calcifications.    Stone composition was 100% calcium oxalate.     PLAN    72 yo M with hx of single prior stone event s/p ureteroscopic clearance of left distal ureteral calculus, asymptomatic. No imaging warranted.    He has previously undergone stone risk evaluation with identified risk of low urine volume. He will work to increase and sustain high fluid intake.    He will return in 12 months with imaging.    Patient also seen and examined by KRISTA Park   Review of systems is negative except for HPI.    Past Medical History:   Diagnosis Date     High cholesterol      Kidney stone     recurrent       Past Surgical History:   Procedure Laterality Date     ABDOMINAL SURGERY      exploratory surgery at age 13, Appendectomy     CYSTOSCOPY W/ URETERAL STENT PLACEMENT Left 10/24/2017    Procedure: CYSTOSCOPY, WITH URETEROSCOPY, CALCULUS REMOVAL, AND URETERAL STENT INSERTION;  Surgeon: Gasper Conteh MD;  Location: [facility];  Service:      JOINT REPLACEMENT Left 2010    knee     URETEROSCOPY Right 2013       Current Outpatient Prescriptions   Medication Sig Dispense Refill     ascorbic acid (ASCORBIC ACID WITH BLAKE HIPS) 500 MG tablet Take 500 mg by mouth daily.       aspirin 81 MG EC tablet Take 81 mg by mouth daily.       cyanocobalamin, vitamin B-12, 1,000 mcg/mL Kit Inject as directed every 30  (thirty) days.        ibuprofen (ADVIL,MOTRIN) 600 MG tablet Take 1 tablet (600 mg total) by mouth every 6 (six) hours as needed for pain. 20 tablet 0     meclizine (ANTIVERT) 25 mg tablet Take 1 tablet (25 mg total) by mouth 3 (three) times a day as needed. 15 tablet 0     simvastatin (ZOCOR) 20 MG tablet Take 20 mg by mouth at bedtime.       No current facility-administered medications for this visit.        Allergies   Allergen Reactions     Iodinated Contrast- Oral And Iv Dye Rash       Social History     Social History     Marital status:      Spouse name: N/A     Number of children: N/A     Years of education: N/A     Occupational History      [employer]      Social History Main Topics     Smoking status: Current Every Day Smoker     Years: 20.00     Types: Cigarettes     Smokeless tobacco: Never Used      Comment: 3 packs per week     Alcohol use Yes      Comment: rarely     Drug use: No     Sexual activity: Not on file     Other Topics Concern     Not on file     Social History Narrative       Family History   Problem Relation Age of Onset     Heart disease Father      Objective:      Physical Exam  Vitals:    11/30/17 1005   BP: 173/84   Pulse: (!) 54   Temp: 98  F (36.7  C)     General - well developed, well nourished, appropriate for age. Appears no distress at this time  Abdomen - slender soft, non-tender, no hepatosplenomegaly, no masses.   - no flank tenderness, no suprapubic tenderness, kidney and bladder non-palpable  MSK - normal spinal curvature. no spinal tenderness. normal gait. muscular strength intact.  Psych - oriented to time, place, and person, normal mood and affect.        Labs   Urinalysis POC (Office):  Blood UA   Date Value Ref Range Status   07/15/2015 Negative Negative Final   06/05/2015 Negative Negative Final   01/08/2015 Negative Negative Final     Nitrite, UA   Date Value Ref Range Status   11/30/2017 Negative Negative Final    10/31/2017 Negative Negative Final   10/17/2017 Negative Negative Final     Leukocytes, UA    Date Value Ref Range Status   07/15/2015 Negative Negative Final   06/05/2015 Negative Negative Final   01/08/2015 Negative Negative Final     pH UA   Date Value Ref Range Status   07/15/2015 5.5 4.5 - 8.0 Final   06/05/2015 5.5 4.5 - 8.0 Final   01/08/2015 7.0 4.5 - 8.0 Final       Lab Urinalysis:  Blood, UA   Date Value Ref Range Status   11/30/2017 Negative Negative Final   10/31/2017 Large (!) Negative Final   10/17/2017 Negative Negative Final     Nitrite, UA   Date Value Ref Range Status   11/30/2017 Negative Negative Final   10/31/2017 Negative Negative Final   10/17/2017 Negative Negative Final     Leukocytes, UA   Date Value Ref Range Status   11/30/2017 Negative Negative Final   10/31/2017 Small (!) Negative Final   10/17/2017 Negative Negative Final     pH, UA   Date Value Ref Range Status   11/30/2017 7.0 5.0 - 8.0 Final   10/31/2017 6.5 5.0 - 8.0 Final   10/17/2017 5.5 5.0 - 8.0 Final

## 2021-06-16 PROBLEM — N20.9 URINARY TRACT STONES: Status: ACTIVE | Noted: 2017-11-30

## 2021-06-16 PROBLEM — N20.9 URINARY CALCULUS, UNSPECIFIED: Status: ACTIVE | Noted: 2017-09-05

## 2021-08-01 ENCOUNTER — HEALTH MAINTENANCE LETTER (OUTPATIENT)
Age: 76
End: 2021-08-01

## 2021-09-26 ENCOUNTER — HEALTH MAINTENANCE LETTER (OUTPATIENT)
Age: 76
End: 2021-09-26

## 2022-01-05 ENCOUNTER — LAB REQUISITION (OUTPATIENT)
Dept: LAB | Facility: CLINIC | Age: 77
End: 2022-01-05
Payer: MEDICARE

## 2022-01-05 DIAGNOSIS — Z03.818 ENCOUNTER FOR OBSERVATION FOR SUSPECTED EXPOSURE TO OTHER BIOLOGICAL AGENTS RULED OUT: ICD-10-CM

## 2022-01-05 PROCEDURE — U0003 INFECTIOUS AGENT DETECTION BY NUCLEIC ACID (DNA OR RNA); SEVERE ACUTE RESPIRATORY SYNDROME CORONAVIRUS 2 (SARS-COV-2) (CORONAVIRUS DISEASE [COVID-19]), AMPLIFIED PROBE TECHNIQUE, MAKING USE OF HIGH THROUGHPUT TECHNOLOGIES AS DESCRIBED BY CMS-2020-01-R: HCPCS | Mod: ORL | Performed by: STUDENT IN AN ORGANIZED HEALTH CARE EDUCATION/TRAINING PROGRAM

## 2022-01-06 LAB — SARS-COV-2 RNA RESP QL NAA+PROBE: NEGATIVE

## 2022-01-14 ENCOUNTER — LAB REQUISITION (OUTPATIENT)
Dept: LAB | Facility: CLINIC | Age: 77
End: 2022-01-14
Payer: MEDICARE

## 2022-01-14 DIAGNOSIS — E78.2 MIXED HYPERLIPIDEMIA: ICD-10-CM

## 2022-01-14 LAB
ALBUMIN SERPL-MCNC: 3.9 G/DL (ref 3.5–5)
ALP SERPL-CCNC: 97 U/L (ref 45–120)
ALT SERPL W P-5'-P-CCNC: 18 U/L (ref 0–45)
ANION GAP SERPL CALCULATED.3IONS-SCNC: 10 MMOL/L (ref 5–18)
AST SERPL W P-5'-P-CCNC: 20 U/L (ref 0–40)
BILIRUB SERPL-MCNC: 0.7 MG/DL (ref 0–1)
BUN SERPL-MCNC: 12 MG/DL (ref 8–28)
CALCIUM SERPL-MCNC: 9.3 MG/DL (ref 8.5–10.5)
CHLORIDE BLD-SCNC: 108 MMOL/L (ref 98–107)
CHOLEST SERPL-MCNC: 158 MG/DL
CO2 SERPL-SCNC: 25 MMOL/L (ref 22–31)
CREAT SERPL-MCNC: 0.82 MG/DL (ref 0.7–1.3)
FASTING STATUS PATIENT QL REPORTED: NORMAL
GFR SERPL CREATININE-BSD FRML MDRD: 90 ML/MIN/1.73M2
GLUCOSE BLD-MCNC: 86 MG/DL (ref 70–125)
HDLC SERPL-MCNC: 43 MG/DL
LDLC SERPL CALC-MCNC: 96 MG/DL
POTASSIUM BLD-SCNC: 4.1 MMOL/L (ref 3.5–5)
PROT SERPL-MCNC: 6.5 G/DL (ref 6–8)
SODIUM SERPL-SCNC: 143 MMOL/L (ref 136–145)
TRIGL SERPL-MCNC: 93 MG/DL

## 2022-01-14 PROCEDURE — 80053 COMPREHEN METABOLIC PANEL: CPT | Mod: ORL | Performed by: FAMILY MEDICINE

## 2022-01-14 PROCEDURE — 80061 LIPID PANEL: CPT | Mod: ORL | Performed by: FAMILY MEDICINE

## 2022-08-28 ENCOUNTER — HEALTH MAINTENANCE LETTER (OUTPATIENT)
Age: 77
End: 2022-08-28

## 2022-11-17 ENCOUNTER — HOSPITAL ENCOUNTER (OUTPATIENT)
Dept: CT IMAGING | Facility: HOSPITAL | Age: 77
Discharge: HOME | End: 2022-11-17
Admitting: PHYSICIAN ASSISTANT
Payer: MEDICARE

## 2022-11-17 DIAGNOSIS — R69 DIAGNOSIS UNKNOWN: Primary | ICD-10-CM

## 2022-11-17 DIAGNOSIS — N20.0 CALCULUS OF KIDNEY: Primary | ICD-10-CM

## 2022-11-17 DIAGNOSIS — N20.0 CALCULUS OF KIDNEY: ICD-10-CM

## 2022-11-17 PROCEDURE — G1010 CDSM STANSON: HCPCS

## 2022-11-21 ENCOUNTER — VIRTUAL VISIT (OUTPATIENT)
Dept: UROLOGY | Facility: CLINIC | Age: 77
End: 2022-11-21
Payer: MEDICARE

## 2022-11-21 VITALS — HEIGHT: 70 IN | BODY MASS INDEX: 28.63 KG/M2 | WEIGHT: 200 LBS

## 2022-11-21 DIAGNOSIS — R69 DIAGNOSIS UNKNOWN: ICD-10-CM

## 2022-11-21 DIAGNOSIS — N20.1 CALCULUS OF URETER: Primary | ICD-10-CM

## 2022-11-21 PROCEDURE — 99203 OFFICE O/P NEW LOW 30 MIN: CPT | Mod: 95 | Performed by: UROLOGY

## 2022-11-21 RX ORDER — CYANOCOBALAMIN 1000 UG/ML
INJECTION, SOLUTION INTRAMUSCULAR; SUBCUTANEOUS
COMMUNITY

## 2022-11-21 RX ORDER — NICOTINE POLACRILEX 4 MG/1
20 GUM, CHEWING ORAL DAILY
COMMUNITY

## 2022-11-21 ASSESSMENT — PAIN SCALES - GENERAL: PAINLEVEL: NO PAIN (0)

## 2022-11-21 NOTE — PROGRESS NOTES
Assessment/Plan:    Assessment & Plan   Raymon was seen today for new patient.    Diagnoses and all orders for this visit:    Calculus of ureter  -     Patient Stated Goal: Pass my stone  -     CT Abdomen Pelvis w/o Contrast; Future    Diagnosis unknown  -     Adult Urology  Referral        Stone Management Plan  Stone Management 11/21/2022   Urinary Tract Infection No suspicion of infection   Renal Colic Well controlled symptoms   Renal Failure No suspicion of renal failure   Current CT date 11/17/2022   Right sided stones? No   R Stone Event No current event   Left sided stones? Yes   L Number of ureteral stones 1   L GSD of ureteral stones 4   L Location of ureteral stone Distal   L Number of kidney stones  No renal stones   L Hydronephrosis None   L Stone Event New event   Diagnosis date 11/17/2022   Initial location of primary symptomatic stone Distal   Initial GSD of primary symptomatic stone 4             PLAN    Will proceed with medical expulsive therapy. Risks and benefits were detailed of medical expulsive therapy including probability of stone passage, recurrent renal colic, and requirement of emergency medical and/or surgical care and further imaging. Patient verbalized understanding. Patient agrees with plan as discussed. He will return in 2 weeks with low dose CT scan.    Over the counter symptom control medications of ibuprofen, Dramamine and Tylenol were recommended.    Video call duration: 15 minutes  Distant site (provider site): remote  20 minutes spent on the date of the encounter doing chart review, history and exam, documentation and further activities per the note    SAMINA MASON MD  [facility]    Subjective:     HPI  Mr. Raymon Delacruz is a 77 year old  male who is being evaluated via a billable video visit by [facility] for concerns of stone disease.    He is a recurrent calcium oxalate stone former  who has required stone clearance procedures.     He has been having mild to moderate left flank pain for about a week. Seems like prior stone episode but not as severe and more anterior then previous. No fever or chills. No voiding symptoms.     CT scan is personally reviewed and demonstrates a 4 mm left distal stone. Radiology report did not pick this up but I reviewed with radiologist and they confirm that this is a stone with minimal obstructive findings which is in the same distal ureteric location as one I removed in 2017. Easy to be deceived by this..    He would like to try to pass. Will see him back with repeat imaging.    ROS   Review of systems is negative except for HPI    No past medical history on file.    Past Surgical History:   Procedure Laterality Date     ABDOMEN SURGERY      exploratory surgery at age 13, Appendectomy     COMBINED CYSTOSCOPY, INSERT STENT URETER(S) Left 10/24/2017    Procedure: CYSTOSCOPY, WITH URETEROSCOPY, CALCULUS REMOVAL, AND URETERAL STENT INSERTION;  Surgeon: Gasper Conteh MD;  Location: [facility];  Service:      JOINT REPLACEMENT Left 2010    knee     URETEROSCOPY Right 2013       Current Outpatient Medications   Medication Sig Dispense Refill     ascorbic acid (ASCORBIC ACID WITH BLAKE HIPS) 500 MG tablet [ASCORBIC ACID (ASCORBIC ACID WITH BLAKE HIPS) 500 MG TABLET] Take 500 mg by mouth daily.       cyanocobalamin (CYANOCOBALAMIN) 1000 MCG/ML injection Inject as directed every 30 (thirty) days.       omeprazole 20 MG tablet Take 20 mg by mouth daily       simvastatin (ZOCOR) 20 MG tablet [SIMVASTATIN (ZOCOR) 20 MG TABLET] Take 20 mg by mouth at bedtime.       aspirin 81 MG EC tablet [ASPIRIN 81 MG EC TABLET] Take 81 mg by mouth daily.       cyanocobalamin, vitamin B-12, 1,000 mcg/mL Kit [CYANOCOBALAMIN, VITAMIN B-12, 1,000 MCG/ML KIT] Inject as directed every 30 (thirty) days.        ibuprofen (ADVIL,MOTRIN) 600 MG tablet [IBUPROFEN (ADVIL,MOTRIN) 600 MG  TABLET] Take 1 tablet (600 mg total) by mouth every 6 (six) hours as needed for pain. 20 tablet 0     meclizine (ANTIVERT) 25 mg tablet [MECLIZINE (ANTIVERT) 25 MG TABLET] Take 1 tablet (25 mg total) by mouth 3 (three) times a day as needed. 15 tablet 0       Allergies   Allergen Reactions     Contrast Dye Rash     Iodinated Contrast Media [Diagnostic X-Ray Materials] Rash       Social History     Socioeconomic History     Marital status:      Spouse name: Not on file     Number of children: Not on file     Years of education: Not on file     Highest education level: Not on file   Occupational History     Not on file   Tobacco Use     Smoking status: Every Day     Years: 20.00     Types: Cigarettes     Smokeless tobacco: Never     Tobacco comments:     3 packs per week   Substance and Sexual Activity     Alcohol use: Yes     Comment: Alcoholic Drinks/day: rarely     Drug use: No     Sexual activity: Not on file   Other Topics Concern     Not on file   Social History Narrative     Not on file     Social Determinants of Health     Financial Resource Strain: Not on file   Food Insecurity: Not on file   Transportation Needs: Not on file   Physical Activity: Not on file   Stress: Not on file   Social Connections: Not on file   Intimate Partner Violence: Not on file   Housing Stability: Not on file       Family History   Problem Relation Age of Onset     Heart Disease Father        Objective:     No vitals or physical exam obtained due to virtual visit    LABS  Most Recent 3 CBC's:No lab results found.  Most Recent 3 BMP's:Recent Labs   Lab Test 01/14/22  1029 11/18/20  1453 02/21/19  0904    144 142   POTASSIUM 4.1 4.0 3.9   CHLORIDE 108* 106 107   CO2 25 28 27   BUN 12 13 12   CR 0.82 0.83 0.82   ANIONGAP 10 10 8   SHADY 9.3 9.4 8.9   GLC 86 83 88     Most Recent Urinalysis:No lab results found.  Acute Labs Urine Culture  No results found for: CULTURE

## 2022-11-21 NOTE — PROGRESS NOTES
Patient is roomed via telephone for a virtual visit.  Patient confirmed he is in the [facility] at the time of this appointment.  Patient understands that this visit is billable and agree to proceed with appointment.

## 2022-11-21 NOTE — PATIENT INSTRUCTIONS
Patient Stated Goal: Pass my stone  Symptom Control While Passing A Stone    The goal of [facility] is to let a smaller kidney stone (less than 4 to 5 mm) pass without intervention if possible. Giving your body a chance to clear the stone may take a few hours up to a few weeks.  Keeping you well-informed, safe and fairly comfortable is important.    Drink to thirst  Do not attempt to  flush out  your stone by drinking too much fluid. This does not work and may increase nausea. Drink enough to satisfy your body s thirst. Eating your normal diet is fine.   Medications (that may be suggested or prescribed)    Ibuprofen (Advil or Motrin) Available over the counter  o Take two (200mg) tablets every six hours until the stone passes.  o Prevents spasm of the ureter.    o Decreases pain.      Dramamine* (drowsy version, non-generic formulation) Available over the counter  o Take 50mg at bedtime  o Decreases spasms of the ureter  o Decreases nausea  o Decreases acute pain  o Decreases recurrence of pain for 24 hours  o Will help you sleep  *This medication will cause increase drowsiness, do not drive or operate machinery for 6 hours.      Narcotics (Percocet, Vicodin, Dilaudid) Take as prescribed for severe pain unrelieved by ibuprofen and Dramamine  o Narcotics have significant side effects and only  cover-up  pain. They have no effect on the cause of pain.  o Common side effects  - Confusion, disorientation and sedation - DO NOT DRIVE OR OPERATE MACHINERY WITHIN 24 HOURS  - Nausea - take Dramamine or Zofran or Haldol to help control  - Constipation  - Sleep disturbances      Ondansetron (Zofran) Take as prescribed  o Reserve for severe nausea  o May cause constipation, start over the counter Miralax if needed      Second Line Anti-Nausea Medication: Adding a different anti-nausea medication maybe helpful for persistent nausea.  The combined effect of different types of anti-nausea medications maybe more  effective than either medication by itself, even in higher doses.  o Compazine: Take as prescribed      Information about kidney stones    Crystals can form if chemicals are too concentrated in your urine. If the crystal grows over time, a stone may form. A stone usually isn t painful while it is still in the kidney.    As the stone begins to leave the kidney, you may experience episodes of flank pain as the kidney stone approaches the entrance to the ureter. Some people feel a vague ache in the side.    Kidney stones may fall into the ureter. Some stones are tiny and pass through without causing symptoms. The ureter is a small tube (approximately 1/8 of an inch wide). A kidney stone can get stuck and block the ureter. If this happens, urine backs up and flows back to the kidney. Back pressure on the kidney can cause:  o Severe flank pain radiating to the groin.  o Severe nausea and vomiting.  o The pain can occur in the lower back, side, groin or all three.      When the stone reaches the lower ureter, this can irritate the bladder and sensations of feeling the urge to urinate frequently and urgently may occur.      Once the stone passes out of your ureter and into your bladder, the symptoms of urgency and frequency will often disappear. Sometimes pain will come back for a short period and will not be as severe as before. The passage of the stone from your bladder and out of your body is usually not a problem. The urethra is at least twice as wide as the normal ureter, so the stone doesn t usually block it.    Strain all urine  If you pass the stone, save it. Place it in the container we have provided and bring it to the [facility] within a week of passing it. Your stone will then be sent for analysis which takes about a month.     Signs and symptoms you might experience    Nausea    Decreased appetite    Urinary frequency    Bloody urine     Chills    Fatigue    When to call [facility] or  go to the Emergency Room    Fever with a temperature greater than 100.1    Severe pain    Persistent nausea/vomiting    If the pain worsens or nausea/vomiting is uncontrolled with medications, STOP eating & drinking. You need to have an empty stomach for 8 hours prior to surgery. Call the [facility] immediately at [phone].           Follow-up    Low dose CT scan with doctor visit 1-2 weeks after initial clinic visit per doctor s instructions    Please cancel the CT scan visit if you pass a stone. Reschedule for a one month follow-up with doctor to discuss stone composition and future prevention.    Preventing future stones    Approximately a month after your stone is sent out for analysis, a prevention visit will occur with your provider, to discuss an individualized plan for prevention of new stones and to discuss managing stones that you may still have. Along with the analysis of the kidney stone, blood and urine tests may be indicated to develop this plan. Knowing the type of kidney stones you make, and why, allows the providers at the [facility] to recommend specific ways to prevent them.    Follow-up visits are an important part of monitoring and preventing future re-occurrences.    The [facility] is available for questions or concerns 24 hours a day at [phone]

## 2022-12-05 ENCOUNTER — HOSPITAL ENCOUNTER (OUTPATIENT)
Dept: CT IMAGING | Facility: HOSPITAL | Age: 77
Discharge: HOME | End: 2022-12-05
Attending: UROLOGY | Admitting: UROLOGY
Payer: MEDICARE

## 2022-12-05 ENCOUNTER — VIRTUAL VISIT (OUTPATIENT)
Dept: UROLOGY | Facility: CLINIC | Age: 77
End: 2022-12-05
Payer: MEDICARE

## 2022-12-05 DIAGNOSIS — N20.1 CALCULUS OF URETER: ICD-10-CM

## 2022-12-05 DIAGNOSIS — N20.1 CALCULUS OF URETER: Primary | ICD-10-CM

## 2022-12-05 PROCEDURE — G1010 CDSM STANSON: HCPCS

## 2022-12-05 PROCEDURE — 99213 OFFICE O/P EST LOW 20 MIN: CPT | Mod: 95 | Performed by: UROLOGY

## 2022-12-05 ASSESSMENT — PAIN SCALES - GENERAL: PAINLEVEL: NO PAIN (0)

## 2022-12-05 NOTE — PATIENT INSTRUCTIONS
Patient Stated Goal: Prevent further stones  Calcium Oxalate Stone Prevention Self Management    Drink more fluids:    Drinking more liquids is the best way you can help prevent future stones. Stones can form when substances in the urine are too concentrated. The more you drink, the more urine you will make. This means all substances in the urine will be less concentrated.    How much urine should I be producing?    The usual recommended daily urine production is about 2 to 3 quarts (7910-2642 ml). If you are producing more than 3 quarts of urine on a regular basis, it is possible to deplete important minerals stored in the body.    To measure the amount of urine you produce in a day, you can either:    Collect all urine in a container and measure at the end of the day     Use a measuring cup each time you urinate and add up the amounts at the end of the day     Observe    Color - Dark shabbir urine is concentrated. Light straw color or lighter is dilute and desirable     Odor - Concentrated urine tends to smell stronger. Dilute urine is nearly odorless    Ways to increase your fluid intake    Increasing the amount of fluid you drink is effective for all types of kidney stones. While water is commonly recommended, all fluids are effective for increasing the amount of urine your body produces.    Focus on starting a lifelong habit, rather than a short-term solution.     Keep liquids on hand that you like. Crystal Light is a low calorie appropriate choice.    Drink out of larger glasses. You'll tend to drink more with each serving.     Have an additional glass of fluid with each meal.     Keep a water or drink bottle at work and fill it regularly.     *If you are prone to fluid retention, consult your doctor before making changes to your fluid habits.    Low Oxalate Diet:    Avoid excess amounts or daily consumption of these foods:    All nuts and nut products including peanuts, almonds, pecans, peanut butter, almond  milk    Rhubarb    Chocolate    Soybeans and soy products     Spinach    Wheat Germ    Beets    Maintain a normal calcium diet:    Researches have found that people with low calcium intakes tend to have more stones. Foods with high calcium content are acceptable and include:    Dairy products (including milk, cheese and yogurt)    Meat and fish    Enriched cereals    Dark green vegetables    What about calcium supplements?     Many people take calcium supplements, either on their own or as prescribed by a doctor. Research has indicated that calcium supplements do not usually pose a risk for stone formation.  Calcium citrate is a better choice for a supplement.    Avoid excess salt:    Salt (sodium chloride) is found in abundance in many foods. High sodium levels in the urine can interfere with the kidney's handling of calcium.     Tips for reducing the salt in your diet:    Don't use salt at the table    Reduce the salt used in food preparation. Try 1/2 teaspoon when recipes call for 1 teaspoon.    Use herbs and spices for flavoring instead of salt.    Avoid salty foods.    Check the label before you buy or use a product. Note sodium and portion size information.    Try to consume less than 2,000 mg/day. (1 teaspoon = 2,000 mg)    Foods with high sodium content include:    Processed meat (including luncheon meats, sausage)     Crackers     Instant cereal     Processed cheese     Canned soups     Chips and snack foods     Soy sauce    The [facility] can respond to your questions or concerns 24 hours a day at [phone].

## 2022-12-05 NOTE — PROGRESS NOTES
Patient is roomed via telephone for a virtual visit.  Patient confirmed he is in the [facility] at the time of this appointment.  Patient understands that this visit is billable and agree to proceed with appointment.

## 2022-12-05 NOTE — PROGRESS NOTES
Assessment/Plan:    Assessment & Plan   Raymon was seen today for follow up.    Diagnoses and all orders for this visit:    Calculus of ureter  -     Patient Stated Goal: Prevent further stones        Stone Management Plan  Stone Management 11/21/2022 12/5/2022   Urinary Tract Infection No suspicion of infection No suspicion of infection   Renal Colic Well controlled symptoms Asymptomatic at this time   Renal Failure No suspicion of renal failure No suspicion of renal failure   Current CT date 11/17/2022 12/5/2022   Right sided stones? No No   R Stone Event No current event No current event   Left sided stones? Yes No   L Number of ureteral stones 1 -   L GSD of ureteral stones 4 -   L Location of ureteral stone Distal -   L Number of kidney stones  No renal stones -   L Hydronephrosis None -   L Stone Event New event Resolved event   Diagnosis date 11/17/2022 -   Initial location of primary symptomatic stone Distal -   Initial GSD of primary symptomatic stone 4 -   Resolved date - 12/5/2022   L MET Status - Passed             PLAN    Video call duration: 6 minutes  Distant site (provider site): Remote  11 minutes spent on the date of the encounter doing chart review, history and exam, documentation and further activities per the note    SAMINA MASON MD  [facility]    HPI  Mr. Raymon Delacruz is a 77 year old  male who is being evaluated via a billable video visit by [facility] for medical expulsive therapy follow up.     On last encounter, his 4 mm stone was in left distal ureter with no hydronephrosis. He has had no unanticipated events.    Symptoms have been minimal . He is asymptomatic at present. He denies symptoms of fever, chills, flank pain, nausea, vomiting, urinary frequency and dysuria.     New CT scan was personally reviewed and demonstrates passage of stone with resolution of previous hydronephrosis.     He is  congratulated on passing his stone and will not proceed with stone risk evaluation .    ROS   Review of systems is negative except for HPI.    No past medical history on file.    Past Surgical History:   Procedure Laterality Date     ABDOMEN SURGERY      exploratory surgery at age 13, Appendectomy     COMBINED CYSTOSCOPY, INSERT STENT URETER(S) Left 10/24/2017    Procedure: CYSTOSCOPY, WITH URETEROSCOPY, CALCULUS REMOVAL, AND URETERAL STENT INSERTION;  Surgeon: Gasper Conteh MD;  Location: [facility];  Service:      JOINT REPLACEMENT Left 2010    knee     URETEROSCOPY Right 2013       Current Outpatient Medications   Medication Sig Dispense Refill     ascorbic acid (ASCORBIC ACID WITH BLAKE HIPS) 500 MG tablet [ASCORBIC ACID (ASCORBIC ACID WITH BLAKE HIPS) 500 MG TABLET] Take 500 mg by mouth daily.       aspirin 81 MG EC tablet [ASPIRIN 81 MG EC TABLET] Take 81 mg by mouth daily.       cyanocobalamin (CYANOCOBALAMIN) 1000 MCG/ML injection Inject as directed every 30 (thirty) days.       cyanocobalamin, vitamin B-12, 1,000 mcg/mL Kit [CYANOCOBALAMIN, VITAMIN B-12, 1,000 MCG/ML KIT] Inject as directed every 30 (thirty) days.        ibuprofen (ADVIL,MOTRIN) 600 MG tablet [IBUPROFEN (ADVIL,MOTRIN) 600 MG TABLET] Take 1 tablet (600 mg total) by mouth every 6 (six) hours as needed for pain. 20 tablet 0     meclizine (ANTIVERT) 25 mg tablet [MECLIZINE (ANTIVERT) 25 MG TABLET] Take 1 tablet (25 mg total) by mouth 3 (three) times a day as needed. 15 tablet 0     omeprazole 20 MG tablet Take 20 mg by mouth daily       simvastatin (ZOCOR) 20 MG tablet [SIMVASTATIN (ZOCOR) 20 MG TABLET] Take 20 mg by mouth at bedtime.         Allergies   Allergen Reactions     Contrast Dye Rash     Iodinated Contrast Media [Diagnostic X-Ray Materials] Rash       Social History     Socioeconomic History     Marital status:      Spouse name: Not on file     Number of children: Not on file     Years of education: Not on file      Highest education level: Not on file   Occupational History     Not on file   Tobacco Use     Smoking status: Every Day     Years: 20.00     Types: Cigarettes     Smokeless tobacco: Never     Tobacco comments:     3 packs per week   Substance and Sexual Activity     Alcohol use: Yes     Comment: Alcoholic Drinks/day: rarely     Drug use: No     Sexual activity: Not on file   Other Topics Concern     Not on file   Social History Narrative     Not on file     Social Determinants of Health     Financial Resource Strain: Not on file   Food Insecurity: Not on file   Transportation Needs: Not on file   Physical Activity: Not on file   Stress: Not on file   Social Connections: Not on file   Intimate Partner Violence: Not on file   Housing Stability: Not on file       Family History   Problem Relation Age of Onset     Heart Disease Father        Objective:     Appears AAO x 3  No vitals obtained due to virtual visit    Labs   Most Recent 3 CBC's:No lab results found.  Most Recent 3 BMP's:Recent Labs   Lab Test 01/14/22  1029 11/18/20  1453 02/21/19  0904    144 142   POTASSIUM 4.1 4.0 3.9   CHLORIDE 108* 106 107   CO2 25 28 27   BUN 12 13 12   CR 0.82 0.83 0.82   ANIONGAP 10 10 8   SHADY 9.3 9.4 8.9   GLC 86 83 88     Most Recent Urinalysis:No lab results found.  Acute Labs Urine Culture  No results found for: CULTURE

## 2023-03-07 ENCOUNTER — LAB REQUISITION (OUTPATIENT)
Dept: LAB | Facility: CLINIC | Age: 78
End: 2023-03-07
Payer: MEDICARE

## 2023-03-07 DIAGNOSIS — E78.2 MIXED HYPERLIPIDEMIA: ICD-10-CM

## 2023-03-07 DIAGNOSIS — Z13.1 ENCOUNTER FOR SCREENING FOR DIABETES MELLITUS: ICD-10-CM

## 2023-03-07 LAB
ALBUMIN SERPL BCG-MCNC: 4.4 G/DL (ref 3.5–5.2)
ALP SERPL-CCNC: 106 U/L (ref 40–129)
ALT SERPL W P-5'-P-CCNC: 17 U/L (ref 10–50)
ANION GAP SERPL CALCULATED.3IONS-SCNC: 13 MMOL/L (ref 7–15)
AST SERPL W P-5'-P-CCNC: 18 U/L (ref 10–50)
BILIRUB SERPL-MCNC: 0.3 MG/DL
BUN SERPL-MCNC: 15.4 MG/DL (ref 8–23)
CALCIUM SERPL-MCNC: 9.5 MG/DL (ref 8.8–10.2)
CHLORIDE SERPL-SCNC: 106 MMOL/L (ref 98–107)
CREAT SERPL-MCNC: 0.85 MG/DL (ref 0.67–1.17)
DEPRECATED HCO3 PLAS-SCNC: 25 MMOL/L (ref 22–29)
GFR SERPL CREATININE-BSD FRML MDRD: 89 ML/MIN/1.73M2
GLUCOSE SERPL-MCNC: 109 MG/DL (ref 70–99)
POTASSIUM SERPL-SCNC: 3.7 MMOL/L (ref 3.4–5.3)
PROT SERPL-MCNC: 6.6 G/DL (ref 6.4–8.3)
SODIUM SERPL-SCNC: 144 MMOL/L (ref 136–145)

## 2023-03-07 PROCEDURE — 80061 LIPID PANEL: CPT | Mod: ORL | Performed by: FAMILY MEDICINE

## 2023-03-07 PROCEDURE — 80053 COMPREHEN METABOLIC PANEL: CPT | Mod: ORL | Performed by: FAMILY MEDICINE

## 2023-03-08 LAB
CHOLEST SERPL-MCNC: 165 MG/DL
HDLC SERPL-MCNC: 43 MG/DL
LDLC SERPL CALC-MCNC: 70 MG/DL
NONHDLC SERPL-MCNC: 122 MG/DL
TRIGL SERPL-MCNC: 259 MG/DL

## 2023-04-23 ENCOUNTER — HEALTH MAINTENANCE LETTER (OUTPATIENT)
Age: 78
End: 2023-04-23

## 2024-04-21 ENCOUNTER — HEALTH MAINTENANCE LETTER (OUTPATIENT)
Age: 79
End: 2024-04-21

## 2024-09-04 ENCOUNTER — LAB REQUISITION (OUTPATIENT)
Dept: LAB | Facility: CLINIC | Age: 79
End: 2024-09-04
Payer: MEDICARE

## 2024-09-04 DIAGNOSIS — E78.2 MIXED HYPERLIPIDEMIA: ICD-10-CM

## 2024-09-04 PROCEDURE — 80053 COMPREHEN METABOLIC PANEL: CPT | Mod: ORL | Performed by: FAMILY MEDICINE

## 2024-09-04 PROCEDURE — 80061 LIPID PANEL: CPT | Mod: ORL | Performed by: FAMILY MEDICINE

## 2024-09-05 LAB
ALBUMIN SERPL BCG-MCNC: 4.2 G/DL (ref 3.5–5.2)
ALP SERPL-CCNC: 103 U/L (ref 40–150)
ALT SERPL W P-5'-P-CCNC: 16 U/L (ref 0–70)
ANION GAP SERPL CALCULATED.3IONS-SCNC: 13 MMOL/L (ref 7–15)
AST SERPL W P-5'-P-CCNC: 16 U/L (ref 0–45)
BILIRUB SERPL-MCNC: 0.4 MG/DL
BUN SERPL-MCNC: 12.5 MG/DL (ref 8–23)
CALCIUM SERPL-MCNC: 9 MG/DL (ref 8.8–10.4)
CHLORIDE SERPL-SCNC: 105 MMOL/L (ref 98–107)
CHOLEST SERPL-MCNC: 176 MG/DL
CREAT SERPL-MCNC: 0.82 MG/DL (ref 0.67–1.17)
EGFRCR SERPLBLD CKD-EPI 2021: 89 ML/MIN/1.73M2
FASTING STATUS PATIENT QL REPORTED: ABNORMAL
FASTING STATUS PATIENT QL REPORTED: NORMAL
GLUCOSE SERPL-MCNC: 97 MG/DL (ref 70–99)
HCO3 SERPL-SCNC: 24 MMOL/L (ref 22–29)
HDLC SERPL-MCNC: 40 MG/DL
LDLC SERPL CALC-MCNC: 98 MG/DL
NONHDLC SERPL-MCNC: 136 MG/DL
POTASSIUM SERPL-SCNC: 3.6 MMOL/L (ref 3.4–5.3)
PROT SERPL-MCNC: 6.8 G/DL (ref 6.4–8.3)
SODIUM SERPL-SCNC: 142 MMOL/L (ref 135–145)
TRIGL SERPL-MCNC: 191 MG/DL

## 2025-08-22 ENCOUNTER — HOSPITAL ENCOUNTER (EMERGENCY)
Facility: HOSPITAL | Age: 80
Discharge: HOME | End: 2025-08-22
Attending: EMERGENCY MEDICINE | Admitting: EMERGENCY MEDICINE
Payer: MEDICARE

## 2025-08-22 VITALS
BODY MASS INDEX: 28.46 KG/M2 | TEMPERATURE: 98.4 F | RESPIRATION RATE: 20 BRPM | WEIGHT: 198.8 LBS | HEART RATE: 101 BPM | DIASTOLIC BLOOD PRESSURE: 98 MMHG | OXYGEN SATURATION: 97 % | SYSTOLIC BLOOD PRESSURE: 194 MMHG | HEIGHT: 70 IN

## 2025-08-22 DIAGNOSIS — L03.114 CELLULITIS OF LEFT UPPER EXTREMITY: Primary | ICD-10-CM

## 2025-08-22 LAB
ALBUMIN SERPL BCG-MCNC: 4.3 G/DL (ref 3.5–5.2)
ALP SERPL-CCNC: 114 U/L (ref 40–150)
ALT SERPL W P-5'-P-CCNC: 14 U/L (ref 0–70)
ANION GAP SERPL CALCULATED.3IONS-SCNC: 12 MMOL/L (ref 7–15)
AST SERPL W P-5'-P-CCNC: 16 U/L (ref 0–45)
BASE EXCESS BLDV CALC-SCNC: 2.3 MMOL/L (ref -3–3)
BASOPHILS # BLD AUTO: 0.04 10E3/UL (ref 0–0.2)
BASOPHILS NFR BLD AUTO: 0.4 %
BILIRUB SERPL-MCNC: 0.4 MG/DL
BUN SERPL-MCNC: 12 MG/DL (ref 8–23)
CALCIUM SERPL-MCNC: 9.7 MG/DL (ref 8.8–10.4)
CHLORIDE SERPL-SCNC: 108 MMOL/L (ref 98–107)
CREAT SERPL-MCNC: 0.88 MG/DL (ref 0.67–1.17)
EGFRCR SERPLBLD CKD-EPI 2021: 87 ML/MIN/1.73M2
EOSINOPHIL # BLD AUTO: 0.19 10E3/UL (ref 0–0.7)
EOSINOPHIL NFR BLD AUTO: 2.1 %
ERYTHROCYTE [DISTWIDTH] IN BLOOD BY AUTOMATED COUNT: 13.2 % (ref 10–15)
GLUCOSE SERPL-MCNC: 97 MG/DL (ref 70–99)
HCO3 BLDV-SCNC: 26 MMOL/L (ref 21–28)
HCO3 SERPL-SCNC: 24 MMOL/L (ref 22–29)
HCT VFR BLD AUTO: 44.3 % (ref 40–53)
HGB BLD-MCNC: 14.9 G/DL (ref 13.3–17.7)
IMM GRANULOCYTES # BLD: <0.03 10E3/UL
IMM GRANULOCYTES NFR BLD: 0.2 %
LACTATE SERPL-SCNC: 1.1 MMOL/L (ref 0.7–2)
LYMPHOCYTES # BLD AUTO: 1.83 10E3/UL (ref 0.8–5.3)
LYMPHOCYTES NFR BLD AUTO: 20.3 %
MCH RBC QN AUTO: 30.7 PG (ref 26.5–33)
MCHC RBC AUTO-ENTMCNC: 33.6 G/DL (ref 31.5–36.5)
MCV RBC AUTO: 91.2 FL (ref 78–100)
MONOCYTES # BLD AUTO: 0.79 10E3/UL (ref 0–1.3)
MONOCYTES NFR BLD AUTO: 8.7 %
NEUTROPHILS # BLD AUTO: 6.16 10E3/UL (ref 1.6–8.3)
NEUTROPHILS NFR BLD AUTO: 68.3 %
NRBC # BLD AUTO: <0.03 10E3/UL
NRBC BLD AUTO-RTO: 0 /100
O2/TOTAL GAS SETTING VFR VENT: 21 %
OXYHGB MFR BLDV: 94 % (ref 70–75)
PCO2 BLDV: 37 MM HG (ref 40–50)
PH BLDV: 7.46 [PH] (ref 7.32–7.43)
PLATELET # BLD AUTO: 329 10E3/UL (ref 150–450)
PO2 BLDV: 73 MM HG (ref 25–47)
POTASSIUM SERPL-SCNC: 3.9 MMOL/L (ref 3.4–5.3)
PROCALCITONIN SERPL IA-MCNC: 0.08 NG/ML
PROT SERPL-MCNC: 7.2 G/DL (ref 6.4–8.3)
RBC # BLD AUTO: 4.86 10E6/UL (ref 4.4–5.9)
SAO2 % BLDV: 96.5 % (ref 70–75)
SODIUM SERPL-SCNC: 144 MMOL/L (ref 135–145)
WBC # BLD AUTO: 9.03 10E3/UL (ref 4–11)

## 2025-08-22 PROCEDURE — 250N000013 HC RX MED GY IP 250 OP 250 PS 637: Performed by: EMERGENCY MEDICINE

## 2025-08-22 PROCEDURE — 36415 COLL VENOUS BLD VENIPUNCTURE: CPT | Performed by: EMERGENCY MEDICINE

## 2025-08-22 PROCEDURE — 83605 ASSAY OF LACTIC ACID: CPT | Performed by: EMERGENCY MEDICINE

## 2025-08-22 PROCEDURE — 82805 BLOOD GASES W/O2 SATURATION: CPT | Performed by: EMERGENCY MEDICINE

## 2025-08-22 PROCEDURE — 99283 EMERGENCY DEPT VISIT LOW MDM: CPT | Performed by: EMERGENCY MEDICINE

## 2025-08-22 PROCEDURE — 87040 BLOOD CULTURE FOR BACTERIA: CPT | Performed by: EMERGENCY MEDICINE

## 2025-08-22 PROCEDURE — 87154 CUL TYP ID BLD PTHGN 6+ TRGT: CPT | Performed by: EMERGENCY MEDICINE

## 2025-08-22 PROCEDURE — 84155 ASSAY OF PROTEIN SERUM: CPT | Performed by: EMERGENCY MEDICINE

## 2025-08-22 PROCEDURE — 84145 PROCALCITONIN (PCT): CPT | Performed by: EMERGENCY MEDICINE

## 2025-08-22 PROCEDURE — 85004 AUTOMATED DIFF WBC COUNT: CPT | Performed by: EMERGENCY MEDICINE

## 2025-08-22 RX ORDER — CEPHALEXIN 500 MG/1
500 CAPSULE ORAL 4 TIMES DAILY
Qty: 28 CAPSULE | Refills: 0 | Status: SHIPPED | OUTPATIENT
Start: 2025-08-22 | End: 2025-08-29

## 2025-08-22 RX ORDER — CEPHALEXIN 500 MG/1
500 CAPSULE ORAL ONCE
Status: COMPLETED | OUTPATIENT
Start: 2025-08-22 | End: 2025-08-22

## 2025-08-22 RX ADMIN — CEPHALEXIN 500 MG: 500 CAPSULE ORAL at 22:20

## 2025-08-22 ASSESSMENT — COLUMBIA-SUICIDE SEVERITY RATING SCALE - C-SSRS
6. HAVE YOU EVER DONE ANYTHING, STARTED TO DO ANYTHING, OR PREPARED TO DO ANYTHING TO END YOUR LIFE?: NO
2. HAVE YOU ACTUALLY HAD ANY THOUGHTS OF KILLING YOURSELF IN THE PAST MONTH?: NO
1. IN THE PAST MONTH, HAVE YOU WISHED YOU WERE DEAD OR WISHED YOU COULD GO TO SLEEP AND NOT WAKE UP?: NO

## 2025-08-22 ASSESSMENT — ACTIVITIES OF DAILY LIVING (ADL): ADLS_ACUITY_SCORE: 41

## 2025-08-26 LAB
ACB COMPLEX DNA BLD POS QL NAA+NON-PROBE: NOT DETECTED
B FRAGILIS DNA BLD POS QL NAA+NON-PROBE: NOT DETECTED
C ALBICANS DNA BLD POS QL NAA+NON-PROBE: NOT DETECTED
C AURIS DNA BLD POS QL NAA+NON-PROBE: NOT DETECTED
C GATTII+NEOFOR DNA BLD POS QL NAA+N-PRB: NOT DETECTED
C GLABRATA DNA BLD POS QL NAA+NON-PROBE: NOT DETECTED
C KRUSEI DNA BLD POS QL NAA+NON-PROBE: NOT DETECTED
C PARAP DNA BLD POS QL NAA+NON-PROBE: NOT DETECTED
C TROPICLS DNA BLD POS QL NAA+NON-PROBE: NOT DETECTED
E CLOAC COMP DNA BLD POS NAA+NON-PROBE: NOT DETECTED
E COLI DNA BLD POS QL NAA+NON-PROBE: NOT DETECTED
E FAECALIS DNA BLD POS QL NAA+NON-PROBE: NOT DETECTED
E FAECIUM DNA BLD POS QL NAA+NON-PROBE: NOT DETECTED
ENTEROBACTERALES DNA BLD POS NAA+N-PRB: NOT DETECTED
GP B STREP DNA BLD POS QL NAA+NON-PROBE: NOT DETECTED
HAEM INFLU DNA BLD POS QL NAA+NON-PROBE: NOT DETECTED
K OXYTOCA DNA BLD POS QL NAA+NON-PROBE: NOT DETECTED
KLEBSIELLA SP DNA BLD POS QL NAA+NON-PRB: NOT DETECTED
KLEBSIELLA SP DNA BLD POS QL NAA+NON-PRB: NOT DETECTED
L MONOCYTOG DNA BLD POS QL NAA+NON-PROBE: NOT DETECTED
N MEN DNA BLD POS QL NAA+NON-PROBE: NOT DETECTED
P AERUGINOSA DNA BLD POS NAA+NON-PROBE: NOT DETECTED
PROTEUS SP DNA BLD POS QL NAA+NON-PROBE: NOT DETECTED
S AUREUS DNA BLD POS QL NAA+NON-PROBE: NOT DETECTED
S AUREUS+CONS DNA BLD POS NAA+NON-PROBE: DETECTED
S EPIDERMIDIS DNA BLD POS QL NAA+NON-PRB: NOT DETECTED
S LUGDUNENSIS DNA BLD POS QL NAA+NON-PRB: NOT DETECTED
S MALTOPHILIA DNA BLD POS QL NAA+NON-PRB: NOT DETECTED
S MARCESCENS DNA BLD POS NAA+NON-PROBE: NOT DETECTED
S PNEUM DNA BLD POS QL NAA+NON-PROBE: NOT DETECTED
S PYO DNA BLD POS QL NAA+NON-PROBE: NOT DETECTED
SALMONELLA DNA BLD POS QL NAA+NON-PROBE: NOT DETECTED
STREPTOCOCCUS DNA BLD POS NAA+NON-PROBE: NOT DETECTED

## 2025-08-28 LAB
BACTERIA SPEC CULT: ABNORMAL
BACTERIA SPEC CULT: ABNORMAL
BACTERIA SPEC CULT: NO GROWTH